# Patient Record
Sex: FEMALE | Race: WHITE
[De-identification: names, ages, dates, MRNs, and addresses within clinical notes are randomized per-mention and may not be internally consistent; named-entity substitution may affect disease eponyms.]

---

## 2019-06-07 ENCOUNTER — HOSPITAL ENCOUNTER (OUTPATIENT)
Dept: HOSPITAL 41 - JD.SDS | Age: 59
Discharge: HOME | End: 2019-06-07
Attending: SURGERY
Payer: MEDICAID

## 2019-06-07 VITALS — SYSTOLIC BLOOD PRESSURE: 116 MMHG | DIASTOLIC BLOOD PRESSURE: 71 MMHG

## 2019-06-07 DIAGNOSIS — K57.30: ICD-10-CM

## 2019-06-07 DIAGNOSIS — Z80.0: ICD-10-CM

## 2019-06-07 DIAGNOSIS — D12.3: ICD-10-CM

## 2019-06-07 DIAGNOSIS — F17.210: ICD-10-CM

## 2019-06-07 DIAGNOSIS — Z87.442: ICD-10-CM

## 2019-06-07 DIAGNOSIS — E78.00: ICD-10-CM

## 2019-06-07 DIAGNOSIS — I10: ICD-10-CM

## 2019-06-07 DIAGNOSIS — Z12.11: Primary | ICD-10-CM

## 2019-06-07 DIAGNOSIS — Z79.82: ICD-10-CM

## 2019-06-07 DIAGNOSIS — Z79.899: ICD-10-CM

## 2019-06-07 DIAGNOSIS — E11.9: ICD-10-CM

## 2019-06-07 DIAGNOSIS — D12.2: ICD-10-CM

## 2019-06-07 PROCEDURE — 94640 AIRWAY INHALATION TREATMENT: CPT

## 2019-06-07 PROCEDURE — 45381 COLONOSCOPY SUBMUCOUS NJX: CPT

## 2019-06-07 PROCEDURE — 45385 COLONOSCOPY W/LESION REMOVAL: CPT

## 2019-06-07 PROCEDURE — 45380 COLONOSCOPY AND BIOPSY: CPT

## 2019-06-07 PROCEDURE — 82962 GLUCOSE BLOOD TEST: CPT

## 2019-06-07 NOTE — PCM.OPNOTE
- General Post-Op/Procedure Note


Date of Surgery/Procedure: 06/07/19


Operative Procedure(s): Colonoscopy


Findings: 





1. Ascending colon polyps x2


2. Large (2cm) sessile polyp in transverse colon- incompletely resected


3. Transverse colon polyp


4. Sessile 1.2cm transverse colon polyp


Pre Op Diagnosis: family history of colon cancer in a 1st degree relative


Post-Op Diagnosis: same


Anesthesia Technique: MAC


Primary Surgeon: Annmarie Kincaid


Anesthesia Provider: Nati Mcdermott


Pathology: 





1. Ascending colon polyp x2


2. Partial transverse colon polyp


3. Transverse colon polyp 


4. Transverse colon polyp


Fluid Replacement, Intraop: 1,300


Output, Urine Amount: 0


EBL in mLs: 0


Complications: none apparent


Condition: Good

## 2019-06-07 NOTE — PCM.PREANE
Preanesthetic Assessment





- Anesthesia/Transfusion/Family Hx


Anesthesia History: Prior Anesthesia Without Reaction


Family History of Anesthesia Reaction: No





- Review of Systems


General: No Symptoms


Pulmonary: Cough, Sputum (Chronic cough, productive in the mornings. Smoker 1 

ppd. )


Cardiovascular: No Symptoms


Gastrointestinal: No Symptoms


Neurological: No Symptoms


Other: Reports: Diabetes (Type II, Blood Glucose 201mg/dl)





- Physical Assessment


NPO Status Date: 06/07/19


NPO Status Time: 01:00


O2 Sat by Pulse Oximetry: 95


Respiratory Rate: 16


Vital Signs: 





 Last Vital Signs











Temp  36.6 C   06/07/19 09:00


 


Pulse  102 H  06/07/19 09:00


 


Resp  16   06/07/19 09:00


 


BP  131/79   06/07/19 09:00


 


Pulse Ox  95   06/07/19 09:00











Height: 1.5 m


Weight: 80.739 kg


ASA Class: 2


Mental Status: Alert & Oriented x3


Airway Class: Mallampati = 2


Dentition: Reports: Normal Dentition


Thyro-Mental Finger Breadths: 3


Mouth Opening Finger Breadths: 2


ROM/Head Extension: Full


Lungs: Clear to Auscultation, Normal Respiratory Effort, Decreased Breath Sounds


Cardiovascular: Regular Rate, Regular Rhythm





- Lab


Values: 





 Laboratory Last Values











POC Glucose  201 mg/dL ()  H  06/07/19  09:24    














- Allergies


Allergies/Adverse Reactions: 


 Allergies











Allergy/AdvReac Type Severity Reaction Status Date / Time


 


No Known Allergies Allergy   Verified 04/25/15 15:10














- Acknowledgements


Anesthesia Type Planned: MAC


Pt an Appropriate Candidate for the Planned Anesthesia: Yes


Alternatives and Risks of Anesthesia Discussed w Pt/Guardian: Yes


Pt/Guardian Understands and Agrees with Anesthesia Plan: Yes





PreAnesthesia Questionnaire


Cardiovascular History: Reports: High Cholesterol, Hypertension


Genitourinary History: Reports: Renal Calculus


Neurological History: Reports: Neuropathy, Diabetic


Psychiatric History: Reports: None


Endocrine/Metabolic History: Reports: Diabetes, Type II


Oncologic (Cancer) History: Reports: Cervix





- Past Surgical History


GI Surgical History: Reports: Colostomy


Other GI Surgeries/Procedures: reanotomosis of colostomty about 12 years;


Female  Surgical History: Reports: Hysterectomy





- HOME MEDS


Home Medications: 


 Home Meds





Celecoxib 100 mg PO BID 04/25/15 [History]


Lisinopril [Prinivil] 10 mg PO DAILY 04/25/15 [History]


atorvaSTATin [Lipitor] 40 mg PO DAILY 07/10/16 [History]


Aspirin [Adult Low Dose Aspirin EC] 81 mg PO DAILY 06/07/19 [History]


Dicyclomine [Bentyl] 1 cap PO ASDIRECTED PRN 06/07/19 [History]


Flaxseed Oil [Flaxseed] 1 cap PO DAILY 06/07/19 [History]


Omega-3/DHA/Epa/Fish Oil [Omega 3 500 Softgel] 1 cap PO DAILY 06/07/19 [History]


Triamterene/Hydrochlorothiazid [Triamterene-HCTZ 37.5-25 MG] 1 cap PO DAILY 06/ 07/19 [History]


sitaGLIPtin Phos/Metformin HCl [Janumet 50-1,000 MG] 50 - 1,000 mg PO BID 06/07/ 19 [History]











- CURRENT (IN HOUSE) MEDS


Current Meds: 





 Current Medications





Albuterol (Proventil Neb Soln)  2.5 mg NEB ONETIME ONE


   Stop: 06/07/19 10:13


Lactated Ringer's (Ringers, Lactated)  1,000 mls @ 125 mls/hr IV ASDIRECTED SADIA


   Stop: 06/07/19 23:00


   Last Admin: 06/07/19 09:20 Dose:  125 mls/hr


Lidocaine/Sodium Bicarbonate (Buffered Lidocaine 1% In Ns 8.4%)  0.25 ml IDERM 

ONETIME PRN


   PRN Reason: Prior to IV Start


   Stop: 06/07/19 18:00


   Last Admin: 06/07/19 09:20 Dose:  0.25 ml


Sodium Chloride (Saline Flush)  10 ml FLUSH ASDIRECTED PRN


   PRN Reason: Keep Vein Open


   Stop: 06/07/19 18:00

## 2019-06-07 NOTE — PCM.PRNOTE
- Free Text/Narrative


Note: 





Operative Report





Date of Surgery/Procedure: June 7, 2019


Operative Procedure: Colonoscopy to cecum





Pre Op Diagnosis: Colon cancer in a first-degree relative


Post-Op Diagnosis: Same





Surgeon: Annmarie Kincaid


Anesthesia Technique: MAC


Anesthesia Provider: Nati Mcdermott CRNA





IV Fluid Replacement, Intraop: 1300cc


Output, Urine Amount: 0cc


EBL : 0cc





Findings:  


1. Ascending colon polyps x2


2. Large (2cm) sessile polyp in transverse colon- incompletely resected


3. Transverse colon polyp


4. Sessile 1.2cm transverse colon polyp


5.  Diverticulosis





Specimens: 


1. Ascending colon polyp x2


2. Partial transverse colon polyp


3. Transverse colon polyp 


4. Transverse colon polyp





Indication: 


The patient is a 59year-old lady who presented to the outpatient clinic 

requesting high risk colorectal cancer screening.  The patient has a history of 

colon cancer in her first-degree relatives; her sister was diagnosed at age 45, 

and possible complications of colon cancer.   We discussed the procedure of a 

colonoscopy including the polypectomy and biopsy.  Risks of bleeding and 

perforation were discussed, the patient understood and wished to proceed.  

Written and consent was obtained





Description of the procedure:


The patient was brought to the endoscopy suite and placed in the left lateral 

decubitus position. Appropriate monitors were applied. The patient was given 

MAC anesthesia. An anorectal examination was performed, revealing no 

significant abnormality.  The scope was placed into the rectum and advanced to 

cecum with no significant difficulty. The patients cecum was entered, and the 

ileocecal valve and appendiceal orifice were identified and normal. At this 

point, the scope was withdrawn, paying careful attention to the mucosa. The 

patient had good bowel prep, allowing for visualization of 85-90 % of the 

mucosa.  Two small flat polyps were seen in the ascending colon, each measuring 

2-3 mm.  These were each removed using jumbo cold biopsy forceps.  In the 

proximal transverse colon at approximately 80 cm, a large sessile polyp was 

noted on a fold.  This was approximately 2 cm.  We attempted removal using 

jumbo cold biopsy forceps, and this brought the polyp forward into the field. 

We then attempted resection with a hot snare.  Part of the polyp was removed.  

However, the large resected specimen was lost in the colon and could not be 

retrieved.  We then attempted to take additional pieces using the jumbo cold 

biopsy forceps.  Due to the large size and difficult location of this polyp, it 

was difficult technically to remove the polyp.  The patient also did not relax 

well throughout the procedure and the colon spasmed easily, pushing the scope 

way from the site of interest.  The area was tattooed with Leslie ink and we 

proceeded to inspect the remaining colon.  A 3 mm sessile polyp was noted in 

the transverse colon, this was removed using jumbo cold biopsy forceps.  In the 

more distal transverse colon a 1.2 cm sessile polyp was seen.  This was 

resected in 2 pieces using the hot snare.  The pieces were successfully 

retrieved.  Two additional pieces were taken at the edges using a jumbo cold 

biopsy forceps.  This area was also tattooed using Leslie ink due to its large 

size , so that the site can be reexamined at a later date.  Diverticulosis was 

noted scattered throughout the colon.  The anastomotic site was intact without 

evidence of any abnormality. In the rectum, the scope was retroflexed and no 

abnormalities were noted, except for some hemorrhoidal tissue. The scope was 

withdrawn and the procedure terminated.





The patient tolerated the procedure well. 





Complications: none apparent


Condition: Good, transported to PACU in stable condition





Annmarie Knicaid MD


General Surgery

## 2019-06-07 NOTE — [id number]
Post Anesthesia Note





- EVALUATION WITHIN 48HRS OF ANESTHETIC


Vital Signs in Normal Range: Yes


Patient Participated in Evaluation: Yes


Respiratory Function Stable: Yes


Airway Patent: Yes


Cardiovascular Function Stable: Yes


Hydration Status Stable: Yes


Pain Control Satisfactory: Yes


Nausea and Vomiting Control Satisfactory: Yes


Mental Status Recovered: Yes


Pulse Rate: 99


SaO2: 93 (2L/NC)


Resp Rate: 16


Temperature: 97.6 C


Blood Pressure: 116/71

## 2019-09-30 NOTE — [id number]
Post Anesthesia Note





- EVALUATION WITHIN 48HRS OF ANESTHETIC


Vital Signs in Normal Range: Yes


Patient Participated in Evaluation: Yes


Respiratory Function Stable: Yes


Airway Patent: Yes


Cardiovascular Function Stable: Yes


Hydration Status Stable: Yes


Pain Control Satisfactory: Yes


Nausea and Vomiting Control Satisfactory: Yes


Mental Status Recovered: Yes


Vital Signs: 


92/69. 94% on RA, 99, 18, 96.5 at 0945 Last Vital Signs











  37.1 C   09/30/19 07:55


 


Pulse  96   09/30/19 07:55


 


Resp  20   09/30/19 07:55


 


BP  102/80   09/30/19 07:55


 


Pulse Ox  96   09/30/19 07:55

## 2019-09-30 NOTE — PCM.PREANE
Preanesthetic Assessment





- Procedure


Proposed Procedure: 





colonoscopy





- Anesthesia/Transfusion/Family Hx


Anesthesia History: Prior Anesthesia Without Reaction


Family History of Anesthesia Reaction: No


Transfusion History: No Prior Transfusion(s)


Intubation History: Unknown





- Review of Systems


General: No Symptoms


Pulmonary: No Symptoms


Cardiovascular: No Symptoms


Gastrointestinal: No Symptoms


Neurological: No Symptoms


Other: Reports: None





- Physical Assessment


NPO Status Date: 09/29/19


NPO Status Time: 02:30


Height: 1.5 m


Weight: 79 kg


ASA Class: 3


Mental Status: Alert & Oriented x3


Dentition: Reports: Dentures (upper, bottom adentulous )


Thyro-Mental Finger Breadths: 3


Mouth Opening Finger Breadths: 5


ROM/Head Extension: Full


Lungs: Normal Respiratory Effort, Decreased Breath Sounds (bilateral )


Cardiovascular: Regular Rate, Regular Rhythm





- Allergies


Allergies/Adverse Reactions: 


 Allergies











Allergy/AdvReac Type Severity Reaction Status Date / Time


 


No Known Allergies Allergy   Verified 09/26/19 10:42














- Blood


Blood Available: No





- Anesthesia Plan


Pre-Op Medication Ordered: None





- Acknowledgements


Anesthesia Type Planned: MAC (note completed by CHRISTOPHER Aguillon CRNA )


Pt an Appropriate Candidate for the Planned Anesthesia: Yes


Alternatives and Risks of Anesthesia Discussed w Pt/Guardian: Yes


Pt/Guardian Understands and Agrees with Anesthesia Plan: Yes





PreAnesthesia Questionnaire


HEENT History: Reports: Other (See Below)


Other HEENT History: Full upper and lower dentures (patient doesn't wear lowers)


Cardiovascular History: Reports: High Cholesterol, Hypertension, PVD


Respiratory History: Reports: None


Gastrointestinal History: Reports: Hemorrhoids, Other (See Below)


Other Gastrointestinal History: Diverticulitis, adenomatous colon polyp


Genitourinary History: Reports: Renal Calculus


OB/GYN History: Reports: None


Musculoskeletal History: Reports: Other (See Below)


Other Musculoskeletal History: Carpal tunnel syndrome


Neurological History: Reports: None


Psychiatric History: Reports: None


Endocrine/Metabolic History: Reports: Diabetes, Type II


Hematologic History: Reports: None


Immunologic History: Reports: None


Oncologic (Cancer) History: Reports: Cervix


Dermatologic History: Reports: None





- Past Surgical History


Head Surgeries/Procedures: Reports: None


HEENT Surgical History: Reports: Tonsillectomy, Other (See Below)


Other HEENT Surgeries/Procedures: All teeth exracted


Cardiovascular Surgical History: Reports: None


Respiratory Surgical History: Reports: None


GI Surgical History: Reports: Appendectomy, Cholecystectomy, Colonoscopy, Other 

(See Below)


Other GI Surgeries/Procedures: Partial colectomy with coloproctostomy, 

colostomy takedown, hernia repair


Female  Surgical History: Reports: Hysterectomy


Endocrine Surgical History: Reports: None


Neurological Surgical History: Reports: None


Musculoskeletal Surgical History: Reports: None


Oncologic Surgical History: Reports: Other (See Below)


Other Oncologic Surgeries/Procedures: Hysterectomy with BS


Dermatological Surgical History: Reports: None





- SUBSTANCE USE


Smoking Status *Q: Current Every Day Smoker


Tobacco Use Within Last Twelve Months: Cigarettes


Second Hand Smoke Exposure: Yes


Days Per Week of Alcohol Use: 7


Number of Drinks Per Day: 2


Total Drinks Per Week: 14


Recreational Drug Use History: No





- HOME MEDS


Home Medications: 


 Home Meds





Celecoxib 100 mg PO BID PRN 04/25/15 [History]


Lisinopril [Prinivil] 10 mg PO DAILY 04/25/15 [History]


atorvaSTATin [Lipitor] 40 mg PO DAILY 07/10/16 [History]


Aspirin [Adult Low Dose Aspirin EC] 81 mg PO DAILY 06/07/19 [History]


Dicyclomine [Bentyl] 1 cap PO QID PRN 06/07/19 [History]


Triamterene/Hydrochlorothiazid [Triamterene-HCTZ 37.5-25 MG] 1 cap PO DAILY 06/ 07/19 [History]


sitaGLIPtin Phos/Metformin HCl [Janumet 50-1,000 MG] 50 - 1,000 mg PO BID 06/07/ 19 [History]


Acetaminophen/Codeine [Tylenol with Codeine No.3 300MG/30MG] 1 - 2 tab PO Q4H 

PRN 09/26/19 [History]


Omega-3/DHA/Epa/Fish Oil [Omega-3 Fish Oil 1,000 MG Sfgl] 1,000 mg PO DAILY 09/ 26/19 [History]











- CURRENT (IN HOUSE) MEDS


Current Meds: 





 Current Medications





Lactated Ringer's (Ringers, Lactated)  1,000 mls @ 125 mls/hr IV ASDIRECTED SADIA


   Stop: 09/30/19 23:00


   Last Admin: 09/30/19 08:10 Dose:  125 mls/hr


Lidocaine/Sodium Bicarbonate (Buffered Lidocaine 1% In Ns 8.4%)  0.25 ml IDERM 

ONETIME PRN


   PRN Reason: Prior to IV Start


   Stop: 09/30/19 18:00


Sodium Chloride (Saline Flush)  10 ml FLUSH ASDIRECTED PRN


   PRN Reason: Keep Vein Open


   Stop: 09/30/19 18:00

## 2019-09-30 NOTE — OR
DATE OF OPERATION:  09/30/2019

 

SURGEON:  Hola Wright MD

 

PREOPERATIVE DIAGNOSIS:

Incompletely removed colonic polyps.

 

POSTOPERATIVE DIAGNOSIS:

Normal colonoscopy.

 

OPERATION PERFORMED:  Colonoscopy.

 

ANESTHESIA:

Monitored anesthesia.

 

COMPLICATIONS:

None.

 

INDICATION AND CONSENT:

Ms. Gonzales is a 59-year-old female with prior history of perforated

diverticulitis, status post left hemicolectomy, ileostomy with ileostomy

reversal several years ago.  The patient had a colonoscopy back in June that

noted several polyps that were removed.  However, there was at least 1 polyp

that was incompletely removed.  This was noted to be in the transverse as well

as some probably in the descending colon.  Because of this, the patient was

advised to undergo another repeat colonoscopy for removal of these polyps.  The

patient was seen in the surgical office, evaluated, and agreed to proceed with

the procedure.  Informed consent was obtained.

 

DESCRIPTION OF PROCEDURE:

The patient was brought to the procedure room and placed in the left lateral

decubitus position.  Following induction of anesthesia, procedure was began.  We

began by performing a digital rectal examination.  There were no abnormalities

besides some redness around the anal margin.  Sphincter tone was normal.  Then,

the scope was inserted, advanced all the way into the cecum.  The cecal valve

and appendiceal orifice were photographed.  Then, the scope was slowly withdrawn

taking care to look at all surfaces of the colon including behind the folds,

unable to detect where these polyps are since the polyps were not tattooed in

the prior colonoscopy.  The scope was withdrawn slowly through the ascending

colon, through the transverse colon as well as through the descending colon.

There were diverticulosis in the ascending as well as the descending colon, but

there were no polyps that were noted throughout this exam.  Despite careful

irrigation to make sure all the mucosa was examined, there was no obvious polyps

that were seen anywhere throughout the exam.  On retroflexion, there were no

abnormalities.  The scope was then withdrawn and the patient was awoken from

anesthesia and discharged from the hospital.  However, I discussed the lack of

any findings in this colonoscopy and asked the patient to return to clinic and

have a repeat colonoscopy in 1 year for further surveillance to make sure there

are still no other polyps.  Of note, the rectocolonic anastomosis was also noted

in the distal rectum and this area was also photographed.

 

ESTIMATED BLOOD LOSS:

 

 

DD:  09/30/2019 12:02:46

DT:  09/30/2019 12:35:38  MMODAL

Job #:  [id number]